# Patient Record
Sex: MALE | Race: BLACK OR AFRICAN AMERICAN | NOT HISPANIC OR LATINO | URBAN - METROPOLITAN AREA
[De-identification: names, ages, dates, MRNs, and addresses within clinical notes are randomized per-mention and may not be internally consistent; named-entity substitution may affect disease eponyms.]

---
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
## 2024-12-08 ENCOUNTER — EMERGENCY (EMERGENCY)
Facility: HOSPITAL | Age: 19
LOS: 1 days | Discharge: HOME | End: 2024-12-08
Attending: EMERGENCY MEDICINE
Payer: COMMERCIAL

## 2024-12-08 VITALS
RESPIRATION RATE: 19 BRPM | TEMPERATURE: 98 F | SYSTOLIC BLOOD PRESSURE: 127 MMHG | WEIGHT: 167.99 LBS | HEIGHT: 65 IN | OXYGEN SATURATION: 99 % | DIASTOLIC BLOOD PRESSURE: 82 MMHG | HEART RATE: 97 BPM

## 2024-12-08 VITALS
OXYGEN SATURATION: 99 % | HEART RATE: 84 BPM | SYSTOLIC BLOOD PRESSURE: 117 MMHG | TEMPERATURE: 98 F | RESPIRATION RATE: 18 BRPM | DIASTOLIC BLOOD PRESSURE: 82 MMHG

## 2024-12-08 DIAGNOSIS — Z98.890 OTHER SPECIFIED POSTPROCEDURAL STATES: Chronic | ICD-10-CM

## 2024-12-08 PROBLEM — G47.30 SLEEP APNEA, UNSPECIFIED: Chronic | Status: ACTIVE | Noted: 2017-02-03

## 2024-12-08 PROBLEM — J45.909 UNSPECIFIED ASTHMA, UNCOMPLICATED: Chronic | Status: ACTIVE | Noted: 2017-02-03

## 2024-12-08 PROCEDURE — 99282 EMERGENCY DEPT VISIT SF MDM: CPT

## 2024-12-08 PROCEDURE — 99283 EMERGENCY DEPT VISIT LOW MDM: CPT

## 2024-12-08 NOTE — ED PROVIDER NOTE - NSICDXFAMILYHX_GEN_ALL_CORE_FT
FAMILY HISTORY:  Mother  Still living? Unknown  Family history of Sjogren disease, Age at diagnosis: Age Unknown  FH: lupus erythematosus, Age at diagnosis: Age Unknown

## 2024-12-08 NOTE — ED PROVIDER NOTE - CLINICAL SUMMARY MEDICAL DECISION MAKING FREE TEXT BOX
This is a 19-year-old male, history significant for sickle cell trait, presenting for an episode of total body pain and itching.  The patient states that this happened prior to arrival.  His symptoms lasted for about 15 minutes.  The patient states that he has had multiple episodes of this over the past 4 years.  The patient states that he has never presented to the hospital because the symptoms because they usually self resolved.  The patient states today however the pain was so severe that he had to put his head down briefly but his symptoms went away within 15 minutes as usual.  The patient also reports symptoms he typically turns reds and breaks into hives. Today the patient did and those symptoms also resolved in 15 minutes, The patient otherwise denies any fevers, chills, chest pain, shortness of breath, headaches, visual changes, nausea or vomiting.  ROS is otherwise negative.  VSS.  PE.  No acute findings on examinations.  No hives.  Differential includes but is not limited to autoimmune pathology , allergies, low concern for anaphylaxis, patient with trait so low concern for pain crisis. Symptoms have resolved at this time, given this will hold off on any interventions at this time. Will hold off on labs and give patient Rehum and pcp follow up. Patient is agreeable with plan. This is a 19-year-old male, history significant for sickle cell trait, presenting for an episode of total body pain and itching.  The patient states that this happened prior to arrival.  His symptoms lasted for about 15 minutes.  The patient states that he has had multiple episodes of this over the past 4 years.  The patient states that he has never presented to the hospital because the symptoms because they usually self resolved.  The patient states today however the pain was so severe that he had to put his head down briefly but his symptoms went away within 15 minutes as usual.  The patient also reports symptoms he typically turns reds and breaks into hives. Today the patient did and those symptoms also resolved in 15 minutes, The patient otherwise denies any fevers, chills, chest pain, shortness of breath, headaches, visual changes, nausea or vomiting.  ROS is otherwise negative.  VSS.  PE.  No acute findings on examinations.  No hives.  Differential includes but is not limited to autoimmune pathology , allergies, low concern for anaphylaxis, patient with trait so low concern for pain crisis. Symptoms have resolved at this time, given this will hold off on any interventions at this time. Will hold off on labs and give patient Rheum and pcp follow up. Patient is agreeable with plan.

## 2024-12-08 NOTE — ED PROVIDER NOTE - NSFOLLOWUPINSTRUCTIONS_ED_ALL_ED_FT
Today you were evaluated in the emergency department for symptoms he had prior to arrival.  You had symptoms of total body pain, hives, and redness and you did not have the symptoms here in the emergency department.    At this time there was no interventions or blood work that was done.  We would however like you to follow-up outpatient with a primary care physician and a rheumatologist for these persistent symptoms that you have been having over the years.    Please return to the emergency department if you begin having symptoms and they do not subside after 15 minutes, your pain is increasing, or if you develop chest pain or shortness of breath.

## 2024-12-08 NOTE — ED PROVIDER NOTE - PATIENT PORTAL LINK FT
You can access the FollowMyHealth Patient Portal offered by [facility] by registering at the following website: [email or web address]. By joining [facility]’s FollowMyHealth portal, you will also be able to view your health information using other applications (apps) compatible with our system.

## 2024-12-08 NOTE — ED ADULT NURSE NOTE - NSFALLUNIVINTERV_ED_ALL_ED
Bed/Stretcher in lowest position, wheels locked, appropriate side rails in place/Call bell, personal items and telephone in reach/Instruct patient to call for assistance before getting out of bed/chair/stretcher/Non-slip footwear applied when patient is off stretcher/[place] to call system/Physically safe environment - no spills, clutter or unnecessary equipment/Purposeful proactive rounding/Room/bathroom lighting operational, light cord in reach

## 2024-12-08 NOTE — ED ADULT TRIAGE NOTE - CHIEF COMPLAINT QUOTE
Pt PMH ADHD, sickle cell c/o "head to toe body stinging", intermittent x 4 yrs. Pt c/o "stinging" that occurred with body weakness today.

## 2024-12-08 NOTE — ED PROVIDER NOTE - ATTENDING CONTRIBUTION TO CARE
Brittnee Brar MD - Attending Physician: Pt here with 4 years of sporadic episodes of flushing/total body pain that lasts for minutes and self resolved. Neg eval by Allergy. No further work-up has been done. Had episode today and came here. Here fully asymptomatic. Exam nonfocal and at baseline. Unclear cause, but no concern for emergent pathology. Given chronicity of symptoms no need for urgent ED evaluation that is not going to yield diagnosis. Needs Rheum eval as FH of autoimmune and possible patient diagnosis maybe found. F/u outpatient. Return precautions discussed

## 2024-12-08 NOTE — ED ADULT NURSE NOTE - OBJECTIVE STATEMENT
20yo M aaox4 h/o sickle cells trait , presents with mother to ED , as per pt 4 years ago have an intermittent generalized body pain describe as "sting" also skin turned redness, today had an episode of severe pain at [employer] and mother decide to come to ED for evaluations , on examination Pt denies CP, SOB, HA, vision changes, n/v/d, fevers chills, abdominal pain, Safety and comfort measures initiated- bed placed in lowest position and side rails raised.